# Patient Record
Sex: MALE
[De-identification: names, ages, dates, MRNs, and addresses within clinical notes are randomized per-mention and may not be internally consistent; named-entity substitution may affect disease eponyms.]

---

## 2018-03-20 ENCOUNTER — HOSPITAL ENCOUNTER (EMERGENCY)
Dept: HOSPITAL 42 - ED | Age: 33
Discharge: HOME | End: 2018-03-20
Payer: COMMERCIAL

## 2018-03-20 VITALS — TEMPERATURE: 98.6 F | RESPIRATION RATE: 18 BRPM | OXYGEN SATURATION: 99 %

## 2018-03-20 VITALS — HEART RATE: 72 BPM | DIASTOLIC BLOOD PRESSURE: 78 MMHG | SYSTOLIC BLOOD PRESSURE: 110 MMHG

## 2018-03-20 DIAGNOSIS — S61.215A: Primary | ICD-10-CM
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
## 2018-03-20 NOTE — ED PDOC
Arrival/HPI





- General


Historian: Patient





- History of Present Illness


Time/Duration: Prior to Arrival, 1 hour


Symptom Onset: Sudden


Symptom Course: Improving


Quality: Aching, Stabbing


Severity Level: 7





- General


Chief Complaint: Abnormal Skin Integrity


Time Seen by Provider: 03/20/18 08:54





- History of Present Illness


Narrative History of Present Illness (Text): 





03/20/18 10:22


32[place] [facility] [place] laceration on Left 4th digit (ring finger) after 

cutting it w/ a clean knife earlier today. Patient immediately started applying 

pressure and came to the ED. Motor and sensation in tact.


Denies: Fevers, chills, LOC, numbness/tingling in extremities, chest pain, 

shortness of breath (Oliver Ruby)





Past Medical History





- Provider Review


Nursing Documentation Reviewed: Yes





- Travel History


Have you recently traveled outside [place] w/in the past 3 mons?: No





- Past History


Past History: No Previous





- Infectious Disease


Hx of Infectious Diseases: None





- Psychiatric


Hx Substance Use: No





- Surgical History


Hx Appendectomy: Yes





- Anesthesia


Hx Anesthesia: Yes


Hx Anesthesia Reactions: No





Family/Social History


Family/Social History: Other (non-contributory)


Smoking Status: Unknown If Ever Smoked


Hx Alcohol Use: No


Hx Substance Use: No





Allergies/Home Meds


Allergies/Adverse Reactions: 


Allergies





No Known Allergies Allergy (Verified 03/20/18 08:39)


 











Review of Systems





- Physician Review


All systems were reviewed & negative as marked: Yes





- Review of Systems


Constitutional: Normal


Eyes: Normal


ENT: Normal


Respiratory: Normal


Cardiovascular: Normal


Gastrointestinal: Normal


Genitourinary Male: Normal


Musculoskeletal: Normal


Skin: Laceration (left 4th digit)


Neurological: Normal


Endocrine: Normal


Hemo/Lymphatic: Normal





Physical Exam


Vital Signs Reviewed: Yes


Temperature: Afebrile


Blood Pressure: Normal


Pulse: Regular


Respiratory Rate: Normal


Appearance: Positive for: Well-Appearing, Non-Toxic, Comfortable


Pain Distress: None


Mental Status: Positive for: Alert and Oriented X 3





- Systems Exam


Head: Present: Atraumatic, Normocephalic


Extroacular Muscles: Present: EOMI


Conjunctiva: Present: Normal


Mouth: Present: Moist Mucous Membranes


Respiratory/Chest: Present: Clear to Auscultation, Good Air Exchange.  No: 

Respiratory Distress, Accessory Muscle Use


Cardiovascular: Present: Regular Rate and Rhythm, Normal S1, S2.  No: Murmurs


Abdomen: No: Tenderness, Distention, Peritoneal Signs


Upper Extremity: Present: Normal ROM, NORMAL PULSES, Neurovascularly Intact, 

Other (laceration on distal left 4th digit (ring finger) Laceration involving 

skin, subQ, and fat. Neruovascular intact).  No: Cyanosis, Edema


Lower Extremity: Present: Normal Inspection.  No: Edema, CALF TENDERNESS


Neurological: Present: GCS=15, Speech Normal


Skin: Present: Warm, Laceration (Left distal 4th digit)


Psychiatric: Present: Alert, Oriented x 3





Vital Signs











  Temp Pulse Resp BP Pulse Ox


 


 03/20/18 08:36  98.6 F  94 H  18  141/94 H  99














Medical Decision Making


ED Course and Treatment: 





03/20/18 10:27


laceration involves skin, subQ, dermis


Will do beside repair under local digital block anesthetic


3-0 and 4-0 monofilament non-absorbable


5 sutures in place


 (Oliver Ruby)








03/20/18 11:00


Seen and examined with resident. 31 y/o M p/w finger laceration with clean 

knife. Motor and sensation intact. (Alcides Sánchez)





Disposition/Present on Arrival





- Present on Arrival


Any Indicators Present on Arrival: No


History of DVT/PE: No


History of Uncontrolled Diabetes: No


Urinary Catheter: No


History of Decub. Ulcer: No


History Surgical Site Infection Following: None





- Disposition


Have Diagnosis and Disposition been Completed?: Yes


Disposition Time: 10:29


Patient Plan: Discharge





- Disposition


Diagnosis: 


 Laceration of finger of left hand without damage to nail





Disposition: HOME/ ROUTINE


Patient Problems: 


 Current Active Problems











Problem Status Onset


 


Laceration of finger of left hand without damage to nail Acute  











Condition: STABLE


Discharge Instructions (ExitCare):  Laceration Repair With Stitches (DC)


Additional Instructions: 





Thank you for letting us take care of you today. You were treated for 

laceration of left 4th digit. The emergency medical care you received today was 

directed at your acute symptoms. If you were prescribed any medication, please 

fill it and take as directed. It may take several days for your symptoms to 

resolve. Return to the Emergency Department if your symptoms worsen, do not 

improve, or if you have any other problems.





Wound was irrigated w/ iodine and normal saline and closed w/ non-absorbable 

stitches. Steri-strips were applied on top. The steris can stay on for 3-5 

days. can shower and keep the area clean. Can return back to the ER in 10 days 

for suture removal.


In this packet we have listed two hand specialist if you decide you would like 

to see one. 





Please contact your doctor or call one of the physicians/clinics you have been 

referred to that are listed on the Patient Visit Information form that is 

included in your discharge packet. Bring any paperwork you were given at 

discharge with you along with any medications you are taking to your follow up 

visit. Our treatment cannot replace ongoing medical care by a primary care 

provider (PCP) outside of the emergency department.





Thank you for allowing the [email or web address] team to be part of your care today.





Prescriptions: 


Ibuprofen [Motrin Tab] 800 mg PO Q6 PRN #30 tab


 PRN Reason: Pain, Moderate (4-7)


Referrals: 


Alberto Cole MD [Primary Care Provider] - Follow up with primary


Judi Graham MD [Staff Provider] - Follow up with primary


Ever Oshea MD [Staff Provider] - Follow up with primary


Forms:  [employer] (English)

## 2018-03-20 NOTE — PCM.PROC
Procedures


Attestation:: I certify that I have explained the specified Operation(s) or 

Procedure(s), risks, benefits and reasonable alternatives to the Patient and/or 

other person responsible. The opportunity was given to ask questions and all 

questions answered





- Laceration


  ** lidocaine 1% simple, single layer irregular deep structures intact left 

hand 4-0 dexon nerve block simple, interrupted simple, interrupted other


Site: hand


Side (if applicable): left


Size (cm): 6


Description: flap, irregular, contaminated


Depth: simple, single layer


Anesthesia used: lidocaine 1%


Anesthesia technique: local infiltration, nerve block


Amount (mLs): 5


Pre-repair: wound explored, irrigated extensively, deep structures intact


Skin layer closed with: dexon


Size: 3-0, 4-0


Number of sutures: 5


Technique: simple, interrupted, other (one verticle mattress and the site of 

greatest bleeding)